# Patient Record
Sex: MALE | Race: WHITE | NOT HISPANIC OR LATINO | RURAL
[De-identification: names, ages, dates, MRNs, and addresses within clinical notes are randomized per-mention and may not be internally consistent; named-entity substitution may affect disease eponyms.]

---

## 2023-08-23 ENCOUNTER — OFFICE VISIT (OUTPATIENT)
Dept: FAMILY MEDICINE | Facility: CLINIC | Age: 48
End: 2023-08-23

## 2023-08-23 VITALS
DIASTOLIC BLOOD PRESSURE: 78 MMHG | OXYGEN SATURATION: 98 % | HEART RATE: 96 BPM | HEIGHT: 73 IN | TEMPERATURE: 98 F | WEIGHT: 303 LBS | BODY MASS INDEX: 40.16 KG/M2 | RESPIRATION RATE: 16 BRPM | SYSTOLIC BLOOD PRESSURE: 126 MMHG

## 2023-08-23 DIAGNOSIS — R51.9 ACUTE NONINTRACTABLE HEADACHE, UNSPECIFIED HEADACHE TYPE: ICD-10-CM

## 2023-08-23 DIAGNOSIS — J01.80 ACUTE NON-RECURRENT SINUSITIS OF OTHER SINUS: Primary | ICD-10-CM

## 2023-08-23 DIAGNOSIS — R09.81 NASAL CONGESTION: ICD-10-CM

## 2023-08-23 DIAGNOSIS — J06.9 UPPER RESPIRATORY TRACT INFECTION, UNSPECIFIED TYPE: ICD-10-CM

## 2023-08-23 PROBLEM — J01.90 ACUTE NON-RECURRENT SINUSITIS: Status: ACTIVE | Noted: 2023-08-23

## 2023-08-23 PROCEDURE — 99212 PR OFFICE/OUTPT VISIT, EST, LEVL II, 10-19 MIN: ICD-10-PCS | Mod: ,,, | Performed by: NURSE PRACTITIONER

## 2023-08-23 PROCEDURE — 99212 OFFICE O/P EST SF 10 MIN: CPT | Mod: ,,, | Performed by: NURSE PRACTITIONER

## 2023-08-23 RX ORDER — CETIRIZINE HYDROCHLORIDE, PSEUDOEPHEDRINE HYDROCHLORIDE 5; 120 MG/1; MG/1
1 TABLET, FILM COATED, EXTENDED RELEASE ORAL 2 TIMES DAILY
Qty: 24 TABLET | Refills: 0 | Status: SHIPPED | OUTPATIENT
Start: 2023-08-23 | End: 2023-09-04

## 2023-08-23 RX ORDER — CEFDINIR 300 MG/1
300 CAPSULE ORAL 2 TIMES DAILY
Qty: 20 CAPSULE | Refills: 0 | Status: SHIPPED | OUTPATIENT
Start: 2023-08-23 | End: 2023-09-02

## 2023-08-23 NOTE — PROGRESS NOTES
"Subjective:       Ye Persaud is a 48 y.o. male who presents for evaluation of sinus pain. Symptoms include: congestion and headaches. Onset of symptoms was 5 days ago. Symptoms have been unchanged since that time. Past history is significant for no history of pneumonia or bronchitis. Patient is a non-smoker.    Review of Systems  Pertinent items are noted in HPI.     Objective:      /78 (BP Location: Left arm, Patient Position: Sitting, BP Method: Large (Manual))   Pulse 96   Temp 98.2 °F (36.8 °C) (Oral)   Resp 16   Ht 6' 1" (1.854 m)   Wt (!) 137.4 kg (303 lb)   SpO2 98%   BMI 39.98 kg/m²   General appearance: alert, appears stated age, and cooperative  Head: Normocephalic, without obvious abnormality, atraumatic  Eyes: conjunctivae/corneas clear. PERRL, EOM's intact. Fundi benign.  Ears: abnormal TM right ear - dull and abnormal TM left ear - dull  Nose: Nares normal. Septum midline. Mucosa normal. No drainage or sinus tenderness., moderate congestion  Throat: abnormal findings: mild oropharyngeal erythema  Lungs: clear to auscultation bilaterally  Heart: regular rate and rhythm, S1, S2 normal, no murmur, click, rub or gallop  Extremities: extremities normal, atraumatic, no cyanosis or edema  Skin: Skin color, texture, turgor normal. No rashes or lesions  Lymph nodes: Cervical, supraclavicular, and axillary nodes normal.  Neurologic: Alert and oriented X 3, normal strength and tone. Normal symmetric reflexes. Normal coordination and gait      Assessment:      Acute viral sinusitis.      Plan:      Nasal saline sprays.  Begin Cefdinir if symptoms persist past 10 days  per medication orders.  Take zyrtec-D as prescribed . RTC as needed.   "

## 2023-11-13 ENCOUNTER — OFFICE VISIT (OUTPATIENT)
Dept: FAMILY MEDICINE | Facility: CLINIC | Age: 48
End: 2023-11-13

## 2023-11-13 VITALS
TEMPERATURE: 98 F | HEART RATE: 68 BPM | BODY MASS INDEX: 39.36 KG/M2 | SYSTOLIC BLOOD PRESSURE: 138 MMHG | WEIGHT: 297 LBS | RESPIRATION RATE: 16 BRPM | HEIGHT: 73 IN | OXYGEN SATURATION: 98 % | DIASTOLIC BLOOD PRESSURE: 86 MMHG

## 2023-11-13 DIAGNOSIS — R52 BODY ACHES: ICD-10-CM

## 2023-11-13 DIAGNOSIS — U07.1 COVID-19: ICD-10-CM

## 2023-11-13 DIAGNOSIS — R09.81 CONGESTION OF NASAL SINUS: ICD-10-CM

## 2023-11-13 DIAGNOSIS — R50.9 FEVER, UNSPECIFIED FEVER CAUSE: Primary | ICD-10-CM

## 2023-11-13 LAB
CTP QC/QA: YES
CTP QC/QA: YES
FLUAV AG NPH QL: NEGATIVE
FLUBV AG NPH QL: NEGATIVE
SARS-COV-2 RDRP RESP QL NAA+PROBE: POSITIVE

## 2023-11-13 PROCEDURE — 99213 OFFICE O/P EST LOW 20 MIN: CPT | Mod: ,,, | Performed by: NURSE PRACTITIONER

## 2023-11-13 PROCEDURE — 87804 POCT INFLUENZA A/B: ICD-10-PCS | Mod: 59,QW,, | Performed by: NURSE PRACTITIONER

## 2023-11-13 PROCEDURE — 87635 SARS-COV-2 COVID-19 AMP PRB: CPT | Mod: ,,, | Performed by: NURSE PRACTITIONER

## 2023-11-13 PROCEDURE — 99213 PR OFFICE/OUTPT VISIT, EST, LEVL III, 20-29 MIN: ICD-10-PCS | Mod: ,,, | Performed by: NURSE PRACTITIONER

## 2023-11-13 PROCEDURE — 87635: ICD-10-PCS | Mod: ,,, | Performed by: NURSE PRACTITIONER

## 2023-11-13 PROCEDURE — 87804 INFLUENZA ASSAY W/OPTIC: CPT | Mod: 59,QW,, | Performed by: NURSE PRACTITIONER

## 2023-11-13 NOTE — PROGRESS NOTES
HANNAH Vogel   [facility]  [address] [facility] [address]  [phone]      PATIENT NAME: Ye Persaud  : 1975  DATE: 23  MRN: [id number]      Billing Provider: HANNAH Vogel  Level of Service:   Patient PCP Information       Provider PCP Type    Primary Doctor No General            Reason for Visit / Chief Complaint: Fever (101 Saturday night; pt had exposure to COVID last Thursday.), Generalized Body Aches, and Nasal Congestion       Update PCP  Update Chief Complaint         History of Present Illness / Problem Focused Workflow     Ye Persaud presents to the clinic with Fever (101 Saturday night; pt had exposure to COVID last Thursday.), Generalized Body Aches, and Nasal Congestion     Fever   This is a new problem. The current episode started in the past 7 days. The problem occurs intermittently. The problem has been unchanged. The maximum temperature noted was 100 to 100.9 F. Associated symptoms include congestion, coughing, muscle aches and a sore throat. Pertinent negatives include no abdominal pain, chest pain, diarrhea, ear pain, headaches, nausea, rash, sleepiness, urinary pain, vomiting or wheezing. He has tried acetaminophen for the symptoms. The treatment provided mild relief.       Review of Systems     Review of Systems   Constitutional:  Positive for fever.   HENT:  Positive for nasal congestion, postnasal drip, rhinorrhea and sore throat. Negative for ear pain, nosebleeds, sinus pressure/congestion and sneezing.    Respiratory:  Positive for cough. Negative for chest tightness, shortness of breath and wheezing.    Cardiovascular:  Negative for chest pain.   Gastrointestinal:  Negative for abdominal pain, diarrhea, nausea and vomiting.   Genitourinary:  Negative for dysuria.   Musculoskeletal:  Negative for arthralgias and neck stiffness.   Integumentary:  Negative for rash.   Neurological:  Negative for dizziness,  vertigo and headaches.        Medical / Social / Family History   History reviewed. No pertinent past medical history.    History reviewed. No pertinent surgical history.    Social History  Mr. Persaud  reports that he has never smoked. He has never used smokeless tobacco. He reports that he does not drink alcohol and does not use drugs.    Family History  Mr. Persaud's family history is not on file.    Medications and Allergies     Medications  No outpatient medications have been marked as taking for the 11/13/23 encounter (Office Visit) with Mariza Bolanos FNP.       Allergies  Review of patient's allergies indicates:  No Known Allergies    Physical Examination     Vitals:    11/13/23 1447   BP: 138/86   Pulse: 68   Resp: 16   Temp: 98.4 °F (36.9 °C)     Physical Exam  Vitals and nursing note reviewed.   Constitutional:       General: He is not in acute distress.     Appearance: Normal appearance. He is obese. He is not ill-appearing, toxic-appearing or diaphoretic.   HENT:      Head: Normocephalic.      Right Ear: Tympanic membrane, ear canal and external ear normal. There is no impacted cerumen.      Left Ear: Tympanic membrane, ear canal and external ear normal. There is no impacted cerumen.      Nose: Congestion and rhinorrhea present.      Mouth/Throat:      Mouth: Mucous membranes are moist.      Pharynx: No oropharyngeal exudate or posterior oropharyngeal erythema.      Comments: Clear PND  Eyes:      Conjunctiva/sclera: Conjunctivae normal.      Pupils: Pupils are equal, round, and reactive to light.   Cardiovascular:      Rate and Rhythm: Normal rate and regular rhythm.      Pulses: Normal pulses.      Heart sounds: Normal heart sounds. No murmur heard.  Pulmonary:      Effort: Pulmonary effort is normal. No respiratory distress.      Breath sounds: Normal breath sounds. No wheezing.   Abdominal:      General: Bowel sounds are normal.      Palpations: Abdomen is soft.   Musculoskeletal:      Cervical  "back: Normal range of motion and neck supple. No rigidity.   Skin:     General: Skin is warm and dry.      Capillary Refill: Capillary refill takes 2 to 3 seconds.   Neurological:      Mental Status: He is alert and oriented to person, place, and time.      Comments: Ambulates without difficulty   Psychiatric:         Mood and Affect: Mood normal.         Behavior: Behavior normal.         Thought Content: Thought content normal.         Judgment: Judgment normal.          Lab Results   Component Value Date    WBC 5.12 09/03/2021    HGB 15.1 09/03/2021    HCT 44.2 09/03/2021    MCV 86.7 09/03/2021     09/03/2021        Sodium   Date Value Ref Range Status   09/03/2021 143 136 - 145 mmol/L Final     Potassium   Date Value Ref Range Status   09/03/2021 3.8 3.5 - 5.1 mmol/L Final     Chloride   Date Value Ref Range Status   09/03/2021 112 (H) 98 - 107 mmol/L Final     CO2   Date Value Ref Range Status   09/03/2021 27 21 - 32 mmol/L Final     Glucose   Date Value Ref Range Status   09/03/2021 114 (H) 74 - 106 mg/dL Final     BUN   Date Value Ref Range Status   09/03/2021 15 7 - 18 mg/dL Final     Creatinine   Date Value Ref Range Status   09/03/2021 0.97 0.70 - 1.30 mg/dL Final     Calcium   Date Value Ref Range Status   09/03/2021 8.5 8.5 - 10.1 mg/dL Final     Total Protein   Date Value Ref Range Status   09/03/2021 6.9 6.4 - 8.2 g/dL Final     Albumin   Date Value Ref Range Status   09/03/2021 3.6 3.5 - 5.0 g/dL Final     Bilirubin, Total   Date Value Ref Range Status   09/03/2021 0.2 >0.0 - 1.2 mg/dL Final     Alk Phos   Date Value Ref Range Status   09/03/2021 70 45 - 115 U/L Final     AST   Date Value Ref Range Status   09/03/2021 25 15 - 37 U/L Final     ALT   Date Value Ref Range Status   09/03/2021 40 16 - 61 U/L Final     Anion Gap   Date Value Ref Range Status   09/03/2021 8 7 - 16 mmol/L Final      No results found for: "LABA1C", "HGBA1C"   No results found for: "CHOL"  No results found for: "HDL"  No " "results found for: "LDLCALC"  No results found for: "DLDL"  No results found for: "TRIG"  No results found for: "CHOLHDL"   No results found for: "TSH", "K7XAWFT", "T1KLFVX", "THYROIDAB", "FREET4"     Assessment and Plan (including Health Maintenance)      Problem List  Smart Sets  Document Outside HM   :    Plan:     1. Fever, unspecified fever cause  -     POCT Influenza A/B Rapid Antigen  -     POCT COVID-19 Rapid Screening    2. Body aches  -     POCT Influenza A/B Rapid Antigen  -     POCT COVID-19 Rapid Screening    3. Congestion of nasal sinus  -     POCT Influenza A/B Rapid Antigen  -     POCT COVID-19 Rapid Screening    4. COVID-19         There are no Patient Instructions on file for this visit.     Health Maintenance Due   Topic Date Due    Hepatitis C Screening  Never done    Lipid Panel  Never done    COVID-19 Vaccine (1) Never done    HIV Screening  Never done    TETANUS VACCINE  Never done    Hemoglobin A1c (Diabetic Prevention Screening)  Never done    Colorectal Cancer Screening  Never done    Influenza Vaccine (1) Never done         The patient has no Health Maintenance topics of status Not Due    No future appointments.         Signature:  HANNAH Vogel  [facility]  [address] [facility] [address]  [phone]    Date of encounter: 11/13/23    "

## 2023-11-16 ENCOUNTER — PATIENT MESSAGE (OUTPATIENT)
Dept: RESEARCH | Facility: HOSPITAL | Age: 48
End: 2023-11-16

## 2023-11-27 PROBLEM — J01.90 ACUTE NON-RECURRENT SINUSITIS: Status: RESOLVED | Noted: 2023-08-23 | Resolved: 2023-11-27

## 2025-07-23 ENCOUNTER — OFFICE VISIT (OUTPATIENT)
Dept: FAMILY MEDICINE | Facility: CLINIC | Age: 50
End: 2025-07-23

## 2025-07-23 VITALS
SYSTOLIC BLOOD PRESSURE: 130 MMHG | HEART RATE: 77 BPM | BODY MASS INDEX: 41.08 KG/M2 | OXYGEN SATURATION: 97 % | WEIGHT: 310 LBS | HEIGHT: 73 IN | DIASTOLIC BLOOD PRESSURE: 84 MMHG

## 2025-07-23 DIAGNOSIS — E66.813 CLASS 3 SEVERE OBESITY DUE TO EXCESS CALORIES WITHOUT SERIOUS COMORBIDITY WITH BODY MASS INDEX (BMI) OF 40.0 TO 44.9 IN ADULT: ICD-10-CM

## 2025-07-23 DIAGNOSIS — M25.512 LEFT SHOULDER PAIN, UNSPECIFIED CHRONICITY: Primary | ICD-10-CM

## 2025-07-23 PROCEDURE — 96372 THER/PROPH/DIAG INJ SC/IM: CPT | Mod: ,,, | Performed by: NURSE PRACTITIONER

## 2025-07-23 PROCEDURE — 99213 OFFICE O/P EST LOW 20 MIN: CPT | Mod: 25,,, | Performed by: NURSE PRACTITIONER

## 2025-07-23 RX ORDER — MELOXICAM 7.5 MG/1
7.5 TABLET ORAL DAILY
Qty: 30 TABLET | Refills: 0 | Status: SHIPPED | OUTPATIENT
Start: 2025-07-23

## 2025-07-23 RX ORDER — KETOROLAC TROMETHAMINE 30 MG/ML
30 INJECTION, SOLUTION INTRAMUSCULAR; INTRAVENOUS
Status: COMPLETED | OUTPATIENT
Start: 2025-07-23 | End: 2025-07-23

## 2025-07-23 RX ORDER — METHYLPREDNISOLONE ACETATE 40 MG/ML
40 INJECTION, SUSPENSION INTRA-ARTICULAR; INTRALESIONAL; INTRAMUSCULAR; SOFT TISSUE
Status: COMPLETED | OUTPATIENT
Start: 2025-07-23 | End: 2025-07-23

## 2025-07-23 RX ORDER — DEXAMETHASONE SODIUM PHOSPHATE 4 MG/ML
4 INJECTION, SOLUTION INTRA-ARTICULAR; INTRALESIONAL; INTRAMUSCULAR; INTRAVENOUS; SOFT TISSUE
Status: COMPLETED | OUTPATIENT
Start: 2025-07-23 | End: 2025-07-23

## 2025-07-23 RX ADMIN — METHYLPREDNISOLONE ACETATE 40 MG: 40 INJECTION, SUSPENSION INTRA-ARTICULAR; INTRALESIONAL; INTRAMUSCULAR; SOFT TISSUE at 11:07

## 2025-07-23 RX ADMIN — DEXAMETHASONE SODIUM PHOSPHATE 4 MG: 4 INJECTION, SOLUTION INTRA-ARTICULAR; INTRALESIONAL; INTRAMUSCULAR; INTRAVENOUS; SOFT TISSUE at 11:07

## 2025-07-23 RX ADMIN — KETOROLAC TROMETHAMINE 30 MG: 30 INJECTION, SOLUTION INTRAMUSCULAR; INTRAVENOUS at 11:07

## 2025-07-23 NOTE — PATIENT INSTRUCTIONS
Xray left shoulder obtained, will notify of results when available   Mobic as prescribed, take with food, stop mobic if any GI upset occurs-start tomorrow      Will consider appropriate referral after review of imaging results

## 2025-07-23 NOTE — PROGRESS NOTES
"Clinic note     Patient name: Ye Persaud is a 49 y.o. male   Chief compliant   Chief Complaint   Patient presents with    Shoulder Pain     Left shoulder pain for the last month that is getting worse. Feels a click when he lifts, unstable at times. Works as a [employer]. No previous injury or surgeries.        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 No pertinent past medical history.    History reviewed. No pertinent surgical history.     Family History   Problem Relation Name Age of Onset    Heart attack Father         Current Medications[2]    Review of Systems   Constitutional:  Negative for activity change, appetite change, chills, fatigue, fever and unexpected weight change.   Respiratory:  Negative for cough and shortness of breath.    Cardiovascular:  Negative for chest pain, palpitations and leg swelling.   Gastrointestinal:  Negative for abdominal pain, blood in stool, change in bowel habit, constipation, diarrhea, nausea and vomiting.   Genitourinary:  Negative for difficulty urinating and dysuria.   Musculoskeletal:  Positive for arthralgias. Negative for gait problem and myalgias.   Neurological:  Negative for dizziness, light-headedness, headaches and coordination difficulties.   Psychiatric/Behavioral:  Negative for confusion, dysphoric mood and sleep disturbance. The patient is not nervous/anxious.        Objective     /84 (BP Location: Right arm, Patient Position: Sitting)   Pulse 77   Ht 6' 1" (1.854 m)   Wt (!) 140.6 kg (310 lb)   SpO2 97%   BMI 40.90 kg/m²     Physical Exam   Constitutional: He is oriented to person, place, and time. normal " appearance. No distress.   HENT:   Head: Atraumatic.   Mouth/Throat: Mucous membranes are moist.   Cardiovascular: Normal rate and regular rhythm. Pulmonary:      Effort: Pulmonary effort is normal. No respiratory distress.      Breath sounds: Normal breath sounds. No wheezing, rhonchi or rales.     Abdominal: Soft. Normal appearance. There is no abdominal tenderness.   Musculoskeletal:      Left shoulder: Tenderness and bony tenderness present. Decreased range of motion.      Cervical back: Neck supple.      Comments: Positive Apley scratch test    Neurological: He is alert and oriented to person, place, and time. Gait normal.   Skin: Skin is warm and dry.   Psychiatric: His behavior is normal. Mood normal.       Lab Results   Component Value Date    WBC 5.12 09/03/2021    HGB 15.1 09/03/2021    HCT 44.2 09/03/2021    MCV 86.7 09/03/2021     09/03/2021       CMP  Sodium   Date Value Ref Range Status   09/03/2021 143 136 - 145 mmol/L Final     Potassium   Date Value Ref Range Status   09/03/2021 3.8 3.5 - 5.1 mmol/L Final     Chloride   Date Value Ref Range Status   09/03/2021 112 (H) 98 - 107 mmol/L Final     CO2   Date Value Ref Range Status   09/03/2021 27 21 - 32 mmol/L Final     Glucose   Date Value Ref Range Status   09/03/2021 114 (H) 74 - 106 mg/dL Final     BUN   Date Value Ref Range Status   09/03/2021 15 7 - 18 mg/dL Final     Creatinine   Date Value Ref Range Status   09/03/2021 0.97 0.70 - 1.30 mg/dL Final     Calcium   Date Value Ref Range Status   09/03/2021 8.5 8.5 - 10.1 mg/dL Final     Total Protein   Date Value Ref Range Status   09/03/2021 6.9 6.4 - 8.2 g/dL Final     Albumin   Date Value Ref Range Status   09/03/2021 3.6 3.5 - 5.0 g/dL Final     Bilirubin, Total   Date Value Ref Range Status   09/03/2021 0.2 >0.0 - 1.2 mg/dL Final     Alk Phos   Date Value Ref Range Status   09/03/2021 70 45 - 115 U/L Final     AST   Date Value Ref Range Status   09/03/2021 25 15 - 37 U/L Final     ALT  "  Date Value Ref Range Status   09/03/2021 40 16 - 61 U/L Final     Anion Gap   Date Value Ref Range Status   09/03/2021 8 7 - 16 mmol/L Final     eGFR   Date Value Ref Range Status   09/03/2021 89 >=60 mL/min/1.73m² Final     No results found for: "TSH"  No results found for: "CHOL"  No results found for: "HDL"  No results found for: "LDLCALC"  No results found for: "TRIG"  No results found for: "CHOLHDL"  No results found for: "LABA1C", "HGBA1C"    Lab Results   Component Value Date    [id number] Positive (A) 11/13/2023     Any diagnostic testing results obtained in office or prior to appointment were reviewed were reviewed with patient.    Health Maintenance Due   Topic Date Due    Hepatitis C Screening  Never done    Lipid Panel  Never done    HIV Screening  Never done    Hemoglobin A1c (Diabetic Prevention Screening)  Never done    Colorectal Cancer Screening  Never done    COVID-19 Vaccine (1 - 2024-25 season) Never done         Health Maintenance Topics with due status: Not Due       Topic Last Completion Date    TETANUS VACCINE 08/07/2017    Influenza Vaccine Not Due    RSV Vaccine (Age 60+ and Pregnant patients) Not Due         Assessment and Plan   Left shoulder pain, unspecified chronicity  -     ketorolac injection 30 mg  -     dexAMETHasone injection 4 mg  -     methylPREDNISolone acetate injection 40 mg  -     X-Ray Shoulder 2 or More Views Left; Future; Expected date: 07/23/2025  -     meloxicam (MOBIC) 7.5 MG tablet; Take 1 tablet (7.5 mg total) by mouth once daily. Take with food  Dispense: 30 tablet; Refill: 0    Class 3 severe obesity due to excess calories without serious comorbidity with body mass index (BMI) of 40.0 to 44.9 in adult          Patient Instructions  Patient Instructions   Xray left shoulder obtained, will notify of results when available   Mobic as prescribed, take with food, stop mobic if any GI upset occurs-start tomorrow      Will consider appropriate referral after review of " imaging results                                          [1]   Social History  Tobacco Use    Smoking status: Never    Smokeless tobacco: Never   Substance Use Topics    Alcohol use: Never    Drug use: Never   [2]   Current Outpatient Medications:     meloxicam (MOBIC) 7.5 MG tablet, Take 1 tablet (7.5 mg total) by mouth once daily. Take with food, Disp: 30 tablet, Rfl: 0  No current facility-administered medications for this visit.